# Patient Record
Sex: FEMALE | Race: WHITE
[De-identification: names, ages, dates, MRNs, and addresses within clinical notes are randomized per-mention and may not be internally consistent; named-entity substitution may affect disease eponyms.]

---

## 2021-01-15 ENCOUNTER — HOSPITAL ENCOUNTER (EMERGENCY)
Dept: HOSPITAL 76 - ED | Age: 56
Discharge: HOME | End: 2021-01-15
Payer: COMMERCIAL

## 2021-01-15 VITALS — SYSTOLIC BLOOD PRESSURE: 150 MMHG | DIASTOLIC BLOOD PRESSURE: 87 MMHG

## 2021-01-15 DIAGNOSIS — T22.222A: Primary | ICD-10-CM

## 2021-01-15 DIAGNOSIS — Z88.2: ICD-10-CM
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
## 2021-01-15 DIAGNOSIS — S50.02XA: ICD-10-CM
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
## 2021-01-15 PROCEDURE — 99283 EMERGENCY DEPT VISIT LOW MDM: CPT

## 2021-01-15 PROCEDURE — 73080 X-RAY EXAM OF ELBOW: CPT

## 2021-01-15 PROCEDURE — 99282 EMERGENCY DEPT VISIT SF MDM: CPT

## 2021-01-15 NOTE — XRAY REPORT
PROCEDURE:  Elbow 3 View LT

 

INDICATIONS:  fall, L elbow pain

 

TECHNIQUE:  3 views of the elbow were acquired.  

 

COMPARISON:  None

 

FINDINGS:  

Bones:  No fractures or dislocations.  No suspicious bony lesions.  

 

Soft tissues:  No elbow joint effusion.  No suspicious soft tissue calcifications.  

 

IMPRESSION:  

No acute finding.

 

Reviewed by: Bennett Monreal MD on 1/15/2021 8:51 PM PST

Approved by: Bennett Monreal MD on 1/15/2021 8:51 PM PST

 

 

Station ID:  [id number]
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
